# Patient Record
Sex: MALE | Race: WHITE
[De-identification: names, ages, dates, MRNs, and addresses within clinical notes are randomized per-mention and may not be internally consistent; named-entity substitution may affect disease eponyms.]

---

## 2018-02-01 ENCOUNTER — HOSPITAL ENCOUNTER (OUTPATIENT)
Dept: HOSPITAL 58 - RAD | Age: 48
Discharge: HOME | End: 2018-02-01
Attending: OTOLARYNGOLOGY

## 2018-02-01 DIAGNOSIS — J32.9: Primary | ICD-10-CM

## 2018-02-01 NOTE — CT
Exam:  CT of the sinuses without intravenous contrast. 

  

Comparison:  None available. 

  

Reason for exam:  Chronic sinusitis. 

  

FINDINGS:  No displaced facial fractures are seen.  The frontal sinuses and sphenoid sinuses appear n
ormally pneumatized.  There is mucosal thickening in the ethmoid and left maxillary sinus. There is c
omplete opacification of the right maxillary sinus.  No inflammatory changes are seen within the intr
aconal spaces.  The mastoid air cells appear normally pneumatized. 

  

Impression: 

1. Complete opacification of the right maxillary sinus consistent with chronic sinus disease. 

2.  Mucosal thickening in the ethmoid and left maxillary sinus. 

3.  The frontal sinuses, sphenoid sinuses, and mastoid air cells appear normally pneumatized.

## 2018-02-14 ENCOUNTER — HOSPITAL ENCOUNTER (OUTPATIENT)
Dept: HOSPITAL 58 - SURG | Age: 48
Discharge: HOME | End: 2018-02-14
Attending: OTOLARYNGOLOGY

## 2018-02-14 VITALS — SYSTOLIC BLOOD PRESSURE: 133 MMHG | DIASTOLIC BLOOD PRESSURE: 74 MMHG | TEMPERATURE: 97.6 F

## 2018-02-14 DIAGNOSIS — J32.0: Primary | ICD-10-CM

## 2018-02-14 DIAGNOSIS — D14.0: ICD-10-CM

## 2018-02-14 PROCEDURE — 31267 ENDOSCOPY MAXILLARY SINUS: CPT

## 2018-02-16 NOTE — OP
PREOPERATIVE DIAGNOSIS: 

SINUSITIS



POSTOPERATIVE DIAGNOSIS:

SINUSITIS



OPERATION:

BILATERAL FUNCTIONAL ENDOSCOPIC SINUS SURGERY. 



DESCRIPTION OF PROCEDURE: 

The patient was taken to surgery, placed on the table and general anesthesia 
was administered.  1% Xylocaine with 100,000 Epinephrine was injected in a 
classic septorhinoplasty technique.  The left middle turbinate was then 
fractured medially and then using a 0 degree scope the anterior and posterior 
ethmoid air cells were uncapped. Natural ostomy was identified and it was 
likewise enlarged. A nasal antral window was also created on the inferior 
turbinate.



Attention was then turned to the right side where again the middle turbinate 
was fractured medially and using a Zero degree scope, the anterior and 
posterior ethmoid air cells are then uncapped.   The natural ostium was 
identified and was likewise enlarged. A nasal antral window was likewise 
created on the inferior turbinate and some of the turbinate has some thickened 
inflamed tissue which was removed with a double action rongeur.  Gelfoam 
impregnated with antibiotic ointment was packed in each of the ethmoid areas 
and then a large rhino rocket was placed in each side of the nose. The patient'
s mouth and oropharynx were irrigated copiously with Saline. He  was extubated 
and returned to the recovery room in satisfactory condition. 



JENIFFER

## 2018-02-21 ENCOUNTER — HOSPITAL ENCOUNTER (OUTPATIENT)
Dept: HOSPITAL 58 - RAD | Age: 48
Discharge: HOME | End: 2018-02-21
Attending: OTOLARYNGOLOGY

## 2018-02-21 DIAGNOSIS — J32.9: Primary | ICD-10-CM

## 2018-02-21 DIAGNOSIS — Z98.890: ICD-10-CM

## 2018-02-21 NOTE — CT
EXAM: CT maxillofacial/paranasal sinuses without contrast 

  

HISTORY:  Postop with decreasing vision on the right 

  

COMPARISON:  Sinus CT from 02/01/2018 

  

TECHNIQUE:  Helical axial CT of the paranasal sinuses was obtained without contrast with coronal and 
sagittal reconstructions. 

  

FINDINGS:  There is a small defect in the lamina papyracea inferiorly on the right which is new jadon
red to the previous examination. There is some inflammation of the intraconal and extraconal post sep
lotus right orbital contents with no abscess.  There is some mild thickening of the medial rectus muscl
e as well on the right. There is a fair amount of opacification of the ethmoid air cells bilaterally.
  There is some high attenuation material seen in the ethmoid air cells consistent with postsurgical 
change.  There is improved aeration of the right maxillary sinus compared to prior also with some hig
h attenuation contents consistent with postsurgical changes as well.  There is continued membrane thi
ckening of the left maxillary sinus.  This has increased compared to prior.  Both ostiomeatal units a
re occluded with membrane thickening.  There is also opacification seen in the frontoethmoidal recess
 on the right.  The sphenoid sinus is clear.  There is postsurgical change seen involving the lateral
 wall of the right maxillary sinus. 

  

 The nasal septum is minimally deviated to the right. There is no significant nasal spur. The cribrif
orm plate and ethmoidal fovea are intact.  There is no significant pneumatization of the turbinates. 
There are no Ania or Onodi cells seen. Visualized portions of the intracranial contents, temporal b
ones and temporomandibular joints are normal in appearance. 

  

IMPRESSION: 

1.  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
st septal orbital cellulitis with no abscess. 

2.  Membrane thickening and postsurgical changes as detailed above. 

  

Results discussed with Dr. Pop